# Patient Record
Sex: FEMALE | NOT HISPANIC OR LATINO | Employment: UNEMPLOYED | URBAN - METROPOLITAN AREA
[De-identification: names, ages, dates, MRNs, and addresses within clinical notes are randomized per-mention and may not be internally consistent; named-entity substitution may affect disease eponyms.]

---

## 2024-06-13 ENCOUNTER — HOSPITAL ENCOUNTER (OUTPATIENT)
Dept: RADIOLOGY | Facility: CLINIC | Age: 44
Discharge: HOME | End: 2024-06-13
Payer: COMMERCIAL

## 2024-06-13 VITALS — WEIGHT: 201.06 LBS | BODY MASS INDEX: 35.62 KG/M2 | HEIGHT: 63 IN

## 2024-06-13 DIAGNOSIS — Z12.31 ENCOUNTER FOR SCREENING MAMMOGRAM FOR MALIGNANT NEOPLASM OF BREAST: ICD-10-CM

## 2024-06-13 PROCEDURE — 77067 SCR MAMMO BI INCL CAD: CPT

## 2024-06-17 ENCOUNTER — APPOINTMENT (OUTPATIENT)
Dept: CARDIOLOGY | Facility: CLINIC | Age: 44
End: 2024-06-17
Payer: COMMERCIAL

## 2024-06-17 VITALS
DIASTOLIC BLOOD PRESSURE: 95 MMHG | HEART RATE: 62 BPM | SYSTOLIC BLOOD PRESSURE: 139 MMHG | BODY MASS INDEX: 33.49 KG/M2 | HEIGHT: 63 IN | WEIGHT: 189 LBS

## 2024-06-17 DIAGNOSIS — Z76.89 ENCOUNTER TO ESTABLISH CARE WITH NEW DOCTOR: ICD-10-CM

## 2024-06-17 DIAGNOSIS — E06.3 HASHIMOTO'S THYROIDITIS: ICD-10-CM

## 2024-06-17 DIAGNOSIS — F41.1 GENERALIZED ANXIETY DISORDER: ICD-10-CM

## 2024-06-17 DIAGNOSIS — Z87.891 FORMER SMOKER: ICD-10-CM

## 2024-06-17 DIAGNOSIS — R06.09 DOE (DYSPNEA ON EXERTION): ICD-10-CM

## 2024-06-17 PROBLEM — F33.0 MILD EPISODE OF RECURRENT MAJOR DEPRESSIVE DISORDER (CMS-HCC): Status: ACTIVE | Noted: 2023-07-28

## 2024-06-17 PROCEDURE — 99204 OFFICE O/P NEW MOD 45 MIN: CPT | Performed by: INTERNAL MEDICINE

## 2024-06-17 PROCEDURE — 3008F BODY MASS INDEX DOCD: CPT | Performed by: INTERNAL MEDICINE

## 2024-06-17 PROCEDURE — 1036F TOBACCO NON-USER: CPT | Performed by: INTERNAL MEDICINE

## 2024-06-17 RX ORDER — PAROXETINE HYDROCHLORIDE 40 MG/1
40 TABLET, FILM COATED ORAL
COMMUNITY

## 2024-06-17 RX ORDER — LEVOTHYROXINE SODIUM 125 UG/1
125 TABLET ORAL DAILY
COMMUNITY

## 2024-06-17 NOTE — PATIENT INSTRUCTIONS
Patient to follow up after testing with Dr. Arleen Mckeon MD [facility]     Office will arrange Graded Exercise Stress and Echo in near future.     No other changes today.   Continue same medications and treatments.   Patient educated on proper medication use.   Patient educated on risk factor modification.   Please bring any lab results from other providers / physicians to your next appointment.     Please bring all medicines, vitamins, and herbal supplements with you when you come to the office.     Prescriptions will not be filled unless you are compliant with your follow up appointments or have a follow up appointment scheduled as per instruction of your physician. Refills should be requested at the time of your visit.    Chaim MARX RN am scribing for and in the presence of Dr. Arleen Mckeon MD [facility]

## 2024-06-17 NOTE — PROGRESS NOTES
Referred by Dr. Awad ref. provider found provider found for   Chief Complaint   Patient presents with    [facility] Care     Establish care, sob and chest pain since Dec that comes and goes.         History of Present Illness  Pilar Alfonso is a 43 y.o. year old female patient is here for cardiac evaluation.  She is 43 years old no previous cardiac history.  She says has recently been having increasing episode of shortness of breath associated with left-sided chest pain.  Describes it as heaviness in the chest when she walks.  This has been happening the last few months and has been increasing in frequency and intensity.  Patient past history including history of hypothyroidism taking medication and apparently the levels been normal recently.  She is non-smoker no significant family history of premature coronary disease.  EKG shows sinus rhythm and no acute changes.  I had a lengthy discussion with the patient today about the shortness of breath.  We need to rule out ischemic heart disease or any sort of potential cardiomyopathy especially with history of hypothyroidism.  Will go ahead with echocardiogram and treadmill exercise stress test.  She will follow-up with me after testings are done    Past Medical History  No past medical history on file.    Social History  Social History     Tobacco Use    Smoking status: Former     Types: Cigarettes    Smokeless tobacco: Never   Substance Use Topics    Alcohol use: Not Currently    Drug use: Not Currently       Family History   No family history on file.    Review of Systems  As per HPI, all other systems reviewed and negative.    Allergies:  No Known Allergies     Outpatient Medications:  Current Outpatient Medications   Medication Instructions    levothyroxine (SYNTHROID, LEVOXYL) 125 mcg, oral, Daily    PARoxetine (PAXIL) 40 mg, oral, Daily before breakfast         Vitals:  Vitals:    06/17/24 1613   BP: (!) 139/95   Pulse: 62       Physical Exam:  Physical Exam  Vitals  and nursing note reviewed.   Constitutional:       Appearance: Normal appearance. She is normal weight.   HENT:      Head: Normocephalic and atraumatic.   Eyes:      Extraocular Movements: Extraocular movements intact.      Pupils: Pupils are equal, round, and reactive to light.   Cardiovascular:      Rate and Rhythm: Normal rate and regular rhythm.      Pulses: Normal pulses.   Pulmonary:      Effort: Pulmonary effort is normal.      Breath sounds: Normal breath sounds.   Musculoskeletal:      Cervical back: Normal range of motion.      Right lower leg: No edema.      Left lower leg: No edema.   Skin:     General: Skin is warm and dry.   Neurological:      General: No focal deficit present.      Mental Status: She is alert and oriented to person, place, and time.             Assessment/Plan   Diagnoses and all orders for this visit:  Hashimoto's thyroiditis  Generalized anxiety disorder  MCKENZIE (dyspnea on exertion)  Encounter to establish care with new doctor  BMI 33.0-33.9,adult  Former smoker          Arleen Mckeon MD [facility]  Interventional Cardiology   of [facility]     Thank you for allowing me to participate in the care of this patient. Please do not hesitate to contact me with any further questions or concerns.

## 2024-07-17 ENCOUNTER — APPOINTMENT (OUTPATIENT)
Dept: CARDIOLOGY | Facility: CLINIC | Age: 44
End: 2024-07-17
Payer: COMMERCIAL

## 2024-08-05 ENCOUNTER — APPOINTMENT (OUTPATIENT)
Dept: CARDIOLOGY | Facility: CLINIC | Age: 44
End: 2024-08-05
Payer: COMMERCIAL

## 2024-08-14 ENCOUNTER — APPOINTMENT (OUTPATIENT)
Dept: CARDIOLOGY | Facility: HOSPITAL | Age: 44
End: 2024-08-14
Payer: COMMERCIAL

## 2024-08-15 ENCOUNTER — APPOINTMENT (OUTPATIENT)
Dept: CARDIOLOGY | Facility: CLINIC | Age: 44
End: 2024-08-15
Payer: COMMERCIAL

## 2024-10-18 ENCOUNTER — OFFICE VISIT (OUTPATIENT)
Dept: PRIMARY CARE | Facility: EXTERNAL LOCATION | Age: 44
End: 2024-10-18

## 2024-10-18 VITALS
HEART RATE: 62 BPM | SYSTOLIC BLOOD PRESSURE: 122 MMHG | DIASTOLIC BLOOD PRESSURE: 82 MMHG | WEIGHT: 190 LBS | TEMPERATURE: 97.8 F | OXYGEN SATURATION: 97 %

## 2024-10-18 DIAGNOSIS — R09.82 POST-NASAL DRIP: ICD-10-CM

## 2024-10-18 DIAGNOSIS — R05.1 ACUTE COUGH: Primary | ICD-10-CM

## 2024-10-18 DIAGNOSIS — J02.9 ACUTE PHARYNGITIS, UNSPECIFIED ETIOLOGY: ICD-10-CM

## 2024-10-18 LAB — POC RAPID STREP: NEGATIVE

## 2024-10-18 RX ORDER — FLUTICASONE PROPIONATE 50 MCG
1 SPRAY, SUSPENSION (ML) NASAL DAILY
Qty: 16 G | Refills: 5 | Status: SHIPPED | OUTPATIENT
Start: 2024-10-18 | End: 2025-10-18

## 2024-10-18 RX ORDER — LEVOTHYROXINE SODIUM 125 UG/1
TABLET ORAL
COMMUNITY
Start: 2024-09-29

## 2024-10-18 RX ORDER — PAROXETINE HYDROCHLORIDE 40 MG/1
40 TABLET, FILM COATED ORAL
COMMUNITY
Start: 2024-09-29

## 2024-10-18 RX ORDER — BENZONATATE 200 MG/1
200 CAPSULE ORAL 3 TIMES DAILY PRN
Qty: 21 CAPSULE | Refills: 0 | Status: SHIPPED | OUTPATIENT
Start: 2024-10-18 | End: 2024-10-25

## 2024-10-18 ASSESSMENT — ENCOUNTER SYMPTOMS
SORE THROAT: 1
SHORTNESS OF BREATH: 0
CHILLS: 0
WHEEZING: 0
COUGH: 1
FEVER: 0
PALPITATIONS: 0

## 2024-10-18 NOTE — PATIENT INSTRUCTIONS
- Discussed viral versus bacterial etiology and expected course of illness.  Suspect viral illness.   - Increase liquid intake.  - Can use over the counter medication as needed.   - Use humidified air in sleeping areas.   - Follow up with PCP in 3-4 days if no improvement.   - Return to clinic or go to urgent care sooner with concerns or worsening symptoms.     STEPS YOU CAN TAKE AT HOME  - Get lots of rest, and drink plenty of liquids.  - Drink hot tea.  - Suck on cough drops or hard candy.  - Take over-the-counter cough and cold medicines.  - Breath in warm, moist air, such as in the shower, over a kettle, or from a humidifier.  - Use humidified air in sleeping areas.   - Take a pain-relieving medicine if you have cold or flu symptoms like headache, muscle aches, or joint pain  - Avoid smoking or being around others who smoke.   - Wash hands often.   - Do not share utensils and drinking glasses. Wash these objects with hot, soapy water.  - Do not share foods or drinks with others while you or they are sick.    CALL YOUR PROVIDER/GO TO URGENT CARE OR ED IF:   - You have trouble breathing or swallowing.  - Your neck, tongue, or throat is swollen.  - You are drooling because you cannot swallow your own saliva  - You cannot keep fluids down  - You develop chest pain or shortness of breath  - Your voice sounds strange, like you are talking through your nose.  - You can’t open your mouth all the way.  - You have a stiff neck.   - Fever higher than 100.4°F (38°C)  - Chest pain when you cough, trouble breathing, or coughing up blood  - A barking cough that makes it hard to talk  - Cough and weight loss that you cannot explain  - Symptoms that are worsening

## 2024-10-18 NOTE — PROGRESS NOTES
"Subjective   Patient ID: Pilar Alfonso is a 43 y.o. female who presents for Laryngitis, Sore Throat, Nasal Congestion, and Chest Pain (Congestion, heaviness).    HPI:  2 days ago with sore throat. 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   Then turned into laryngitis, congestion and chest feels \"phlegmy\"  No chest pain or SOB.  Did feel mildly winded when walking up a large hill.   No fevers or chills.     No Known Allergies    Current Outpatient Medications   Medication Sig Dispense Refill    levothyroxine (Synthroid, Levoxyl) 125 mcg tablet TAKE 1 TABLET (125 MCG) BY MOUTH IN THE MORNING. TAKE BEFORE MEALS.      PARoxetine (Paxil) 40 mg tablet Take 1 tablet (40 mg) by mouth once daily in the morning. Take before meals.      benzonatate (Tessalon) 200 mg capsule Take 1 capsule (200 mg) by mouth 3 times a day as needed for cough for up to 7 days. Do not crush or chew. 21 capsule 0    fluticasone (Flonase) 50 mcg/actuation nasal spray Administer 1 spray into each nostril once daily. Shake gently. Before first use, prime pump. After use, clean tip and replace cap. 16 g 5     No current facility-administered medications for this visit.        Past Medical History:   Diagnosis Date    Anxiety     Depression     Hypothyroid        History reviewed. No pertinent surgical history.    Review of Systems   Constitutional:  Negative for chills and fever.   HENT:  Positive for congestion and sore throat.    Respiratory:  Positive for cough. Negative for shortness of breath and wheezing.    Cardiovascular:  Negative for chest pain, palpitations and leg swelling.       Objective   Visit Vitals  /82   Pulse 62   Temp 36.6 °C (97.8 °F)   Wt 86.2 kg (190 lb)   LMP 10/10/2024   SpO2 97%   OB Status Having periods   Smoking Status Never       Office Visit on 10/18/2024   Component Date Value Ref Range Status    POC Rapid Strep 10/18/2024 Negative  Negative Final       Physical Exam  Constitutional:       Appearance: " Normal appearance.      Comments: Hoarse voice    HENT:      Right Ear: Tympanic membrane, ear canal and external ear normal.      Left Ear: Tympanic membrane, ear canal and external ear normal.      Nose: Congestion present.      Mouth/Throat:      Mouth: Mucous membranes are moist.      Pharynx: Oropharynx is clear. Posterior oropharyngeal erythema present.   Eyes:      General:         Right eye: No discharge.         Left eye: No discharge.      Extraocular Movements: Extraocular movements intact.      Conjunctiva/sclera: Conjunctivae normal.      Pupils: Pupils are equal, round, and reactive to light.   Cardiovascular:      Rate and Rhythm: Normal rate and regular rhythm.   Pulmonary:      Effort: Pulmonary effort is normal. No respiratory distress.      Breath sounds: Normal breath sounds. No wheezing, rhonchi or rales.      Comments: Talking in complete sentences without difficulty.   Musculoskeletal:      Cervical back: Normal range of motion and neck supple.   Neurological:      General: No focal deficit present.      Mental Status: She is alert.   Psychiatric:         Mood and Affect: Mood normal.         Behavior: Behavior normal.         Thought Content: Thought content normal.         Assessment/Plan   Diagnoses and all orders for this visit:  Acute cough  -     benzonatate (Tessalon) 200 mg capsule; Take 1 capsule (200 mg) by mouth 3 times a day as needed for cough for up to 7 days. Do not crush or chew.  Post-nasal drip  -     fluticasone (Flonase) 50 mcg/actuation nasal spray; Administer 1 spray into each nostril once daily. Shake gently. Before first use, prime pump. After use, clean tip and replace cap.  Acute pharyngitis, unspecified etiology  -     POCT rapid strep A manually resulted    - Rapid strep negative.   - Discussed viral versus bacterial etiology and expected course of illness.   - Has had symptoms for 2 days. No acute findings on physical exam.   - Suspect viral illness. Recommend OTC  relief as needed. Increase liquid intake.   - Patient declined covid and flu test.   - Follow up in 3-4 days if no improvement. Sooner with concerns or any worsening symptoms.

## 2024-11-04 ENCOUNTER — HOSPITAL ENCOUNTER (OUTPATIENT)
Dept: RADIOLOGY | Facility: HOSPITAL | Age: 44
Discharge: HOME | End: 2024-11-04
Payer: COMMERCIAL

## 2024-11-04 DIAGNOSIS — M54.6 PAIN IN THORACIC SPINE: ICD-10-CM

## 2024-11-04 DIAGNOSIS — G89.29 OTHER CHRONIC PAIN: ICD-10-CM

## 2024-11-04 DIAGNOSIS — R07.9 CHEST PAIN, UNSPECIFIED: ICD-10-CM

## 2024-11-04 PROCEDURE — 71100 X-RAY EXAM RIBS UNI 2 VIEWS: CPT | Mod: LT

## 2024-11-04 PROCEDURE — 73030 X-RAY EXAM OF SHOULDER: CPT | Mod: LEFT SIDE | Performed by: RADIOLOGY

## 2024-11-04 PROCEDURE — 73030 X-RAY EXAM OF SHOULDER: CPT | Mod: LT

## 2024-11-04 PROCEDURE — 72040 X-RAY EXAM NECK SPINE 2-3 VW: CPT

## 2024-11-04 PROCEDURE — 71100 X-RAY EXAM RIBS UNI 2 VIEWS: CPT | Mod: LEFT SIDE | Performed by: RADIOLOGY

## 2024-11-04 PROCEDURE — 72040 X-RAY EXAM NECK SPINE 2-3 VW: CPT | Performed by: RADIOLOGY

## 2024-11-08 ENCOUNTER — APPOINTMENT (OUTPATIENT)
Dept: RADIOLOGY | Facility: HOSPITAL | Age: 44
End: 2024-11-08
Payer: COMMERCIAL

## 2024-12-02 ENCOUNTER — APPOINTMENT (OUTPATIENT)
Dept: RADIOLOGY | Facility: CLINIC | Age: 44
End: 2024-12-02
Payer: COMMERCIAL

## 2024-12-11 ENCOUNTER — OFFICE VISIT (OUTPATIENT)
Dept: PRIMARY CARE | Facility: EXTERNAL LOCATION | Age: 44
End: 2024-12-11

## 2024-12-11 VITALS
OXYGEN SATURATION: 96 % | WEIGHT: 190 LBS | BODY MASS INDEX: 33.66 KG/M2 | SYSTOLIC BLOOD PRESSURE: 124 MMHG | TEMPERATURE: 97.8 F | DIASTOLIC BLOOD PRESSURE: 81 MMHG | HEART RATE: 59 BPM

## 2024-12-11 DIAGNOSIS — J40 BRONCHITIS: Primary | ICD-10-CM

## 2024-12-11 RX ORDER — AZITHROMYCIN 250 MG/1
TABLET, FILM COATED ORAL
Qty: 6 TABLET | Refills: 0 | Status: SHIPPED | OUTPATIENT
Start: 2024-12-11 | End: 2024-12-16

## 2024-12-11 RX ORDER — BENZONATATE 200 MG/1
200 CAPSULE ORAL 3 TIMES DAILY PRN
Qty: 21 CAPSULE | Refills: 0 | Status: SHIPPED | OUTPATIENT
Start: 2024-12-11 | End: 2024-12-18

## 2024-12-11 ASSESSMENT — ENCOUNTER SYMPTOMS
WHEEZING: 0
SHORTNESS OF BREATH: 0
CHILLS: 0
PALPITATIONS: 0
FATIGUE: 1
SINUS PAIN: 0
RHINORRHEA: 1
FEVER: 0
COUGH: 1
SINUS PRESSURE: 0

## 2024-12-11 NOTE — PROGRESS NOTES
Subjective   Patient ID: Pilar Alfonso is a 44 y.o. female who presents for Cough (Sometimes productive /2 weeks).    HPI:  Was seen 10/18/24 for cough. Was given tessalon and Fonase. Got better for a few days. Then got sick again with cough and congestion. Once again got better after a few weeks.  Did not take any antibiotics.     Cough and congestion came back again 2 weeks ago. Does not feel like cough ever completely resolved.  Congestion from 2 weeks ago went away but cough remains.  No sinus pressure but does have slight running nose. No fever or chills.  No chest pain or SOB.   Cough is constant. Cannot stop coughing, feels like in chest. Some fatigue but able to work and complete normal daily activities.  LMP: last week. Denies concern for pregnancy.   Denies history of heart problems or diagnosis of cardiac abnormality.     No Known Allergies    Current Outpatient Medications   Medication Sig Dispense Refill    levothyroxine (Synthroid, Levoxyl) 125 mcg tablet TAKE 1 TABLET (125 MCG) BY MOUTH IN THE MORNING. TAKE BEFORE MEALS.      PARoxetine (Paxil) 40 mg tablet Take 1 tablet (40 mg) by mouth once daily in the morning. Take before meals.      azithromycin (Zithromax) 250 mg tablet Take 2 tablets (500 mg) by mouth once daily for 1 day, THEN 1 tablet (250 mg) once daily for 4 days. Take 2 tabs (500 mg) by mouth today, than 1 daily for 4 days.. 6 tablet 0    benzonatate (Tessalon) 200 mg capsule Take 1 capsule (200 mg) by mouth 3 times a day as needed for cough for up to 7 days. Do not crush or chew. 21 capsule 0     No current facility-administered medications for this visit.        Past Medical History:   Diagnosis Date    Anxiety     Depression     Hashimoto's thyroiditis 07/28/2023    Last Assessment & Plan:    Formatting of this note might be different from the original.   TSH stable from 1/2024 - Continue Synthroid daily      Hypothyroid        History reviewed. No pertinent surgical  history.    Review of Systems   Constitutional:  Positive for fatigue. Negative for chills and fever.   HENT:  Positive for congestion (resolved) and rhinorrhea. Negative for sinus pressure and sinus pain.    Respiratory:  Positive for cough. Negative for shortness of breath and wheezing.    Cardiovascular:  Negative for chest pain and palpitations.       Objective   Visit Vitals  /81   Pulse 59   Temp 36.6 °C (97.8 °F) (Temporal)   Wt 86.2 kg (190 lb)   LMP 11/04/2024   SpO2 96%   BMI 33.66 kg/m²   OB Status Having periods   Smoking Status Never   BSA 1.96 m²       Physical Exam  Constitutional:       General: She is not in acute distress.     Appearance: Normal appearance. She is not ill-appearing or toxic-appearing.   HENT:      Right Ear: Tympanic membrane, ear canal and external ear normal.      Left Ear: Tympanic membrane, ear canal and external ear normal.      Nose: Nose normal.      Mouth/Throat:      Mouth: Mucous membranes are moist.      Pharynx: Oropharynx is clear. No oropharyngeal exudate or posterior oropharyngeal erythema.      Comments: PND noted  Eyes:      General:         Right eye: No discharge.      Extraocular Movements: Extraocular movements intact.      Conjunctiva/sclera: Conjunctivae normal.   Cardiovascular:      Rate and Rhythm: Normal rate and regular rhythm.   Pulmonary:      Effort: Pulmonary effort is normal. No respiratory distress.      Breath sounds: Rhonchi (scant in upper lobes RIGO) present. No wheezing or rales.   Neurological:      General: No focal deficit present.      Mental Status: She is alert.   Psychiatric:         Mood and Affect: Mood normal.         Behavior: Behavior normal.         Thought Content: Thought content normal.       Assessment/Plan   Diagnoses and all orders for this visit:  Bronchitis  -     azithromycin (Zithromax) 250 mg tablet; Take 2 tablets (500 mg) by mouth once daily for 1 day, THEN 1 tablet (250 mg) once daily for 4 days. Take 2 tabs  (500 mg) by mouth today, than 1 daily for 4 days..  -     benzonatate (Tessalon) 200 mg capsule; Take 1 capsule (200 mg) by mouth 3 times a day as needed for cough for up to 7 days. Do not crush or chew.      - Prescription sent to pharmacy and possible side effects discussed.   - Consider CXR if symptoms persist.   - Recommend Flonase daily, Mucinex as needed, increase liquid intake and use humidified air in sleeping areas.   - Follow up with PCP in 3-5 days if no improvement.  Can be seen sooner in UC with concerns or with any worsening symptoms.   - Recommend annual exams/preventative screenings with PCP.

## 2024-12-11 NOTE — PATIENT INSTRUCTIONS
Medication side effects and possible interactions discussed   Can use over the counter Mucinex as needed, increase liquid intake and use humidified air in sleeping areas.   Follow up with your primary care provider if no improvement in 3-5 days. Please be seen sooner with concerns or any worsening symptoms.     STEPS YOU CAN TAKE AT HOME  - Get lots of rest, and drink plenty of liquids.  - Drink hot tea.  - Suck on cough drops or hard candy.  - Take over-the-counter cough and cold medicines.  - Breath in warm, moist air, such as in the shower, over a kettle, or from a humidifier.  - Take a pain-relieving medicine if you have cold or flu symptoms like headache, muscle aches, or joint pain  - Avoid smoking or being around others who smoke.     Follow up with your primary care provider or go to urgent care if you develop:  ?Fever higher than 100.4°F (38°C)  ?Chest pain when you cough, trouble breathing, or coughing up blood  ?A barking cough that makes it hard to talk  ?Cough and weight loss that you cannot explain  ?Symptoms that are not getting better or are getting worse.

## 2024-12-12 ENCOUNTER — APPOINTMENT (OUTPATIENT)
Dept: CARDIOLOGY | Facility: CLINIC | Age: 44
End: 2024-12-12
Payer: COMMERCIAL

## 2025-02-05 ENCOUNTER — OFFICE VISIT (OUTPATIENT)
Dept: ORTHOPEDIC SURGERY | Facility: CLINIC | Age: 45
End: 2025-02-05
Payer: COMMERCIAL

## 2025-02-05 DIAGNOSIS — M76.899 HAMSTRING TENDONITIS: Primary | ICD-10-CM

## 2025-02-05 DIAGNOSIS — S76.311A STRAIN OF INSERTION OF TENDON OF RIGHT HAMSTRING MUSCLE: ICD-10-CM

## 2025-02-05 PROCEDURE — 99213 OFFICE O/P EST LOW 20 MIN: CPT | Performed by: INTERNAL MEDICINE

## 2025-02-05 PROCEDURE — 99203 OFFICE O/P NEW LOW 30 MIN: CPT | Performed by: INTERNAL MEDICINE

## 2025-02-05 NOTE — PROGRESS NOTES
Acute Injury New Patient Visit    CC: No chief complaint on file.      HPI: Pilar is a 44 y.o. female presents today for evaluation for subacute left leg injury sustained while running almost 3 weeks ago. She notes worsening left leg pain and difficulty weightbearing. She is here for initial evaluation and x-rays.  Pain began after running, there was no fall or traumatic injury.  No treatments been done.        Review of Systems   GENERAL: Negative for malaise, significant weight loss, fever  MUSCULOSKELETAL: See HPI  NEURO:  Negative for numbness / tingling     Past Medical History  Past Medical History:   Diagnosis Date    Anxiety     Depression     Hashimoto's thyroiditis 07/28/2023    Last Assessment & Plan:    Formatting of this note might be different from the original.   TSH stable from 1/2024 - Continue Synthroid daily      Hypothyroid        Medication review  Medication Documentation Review Audit       Reviewed by CHUNG Huber (Nurse Practitioner) on 12/11/24 at 1307      Medication Order Taking? Sig Documenting Provider Last Dose Status   fluticasone (Flonase) 50 mcg/actuation nasal spray [id number]  Administer 1 spray into each nostril once daily. Shake gently. Before first use, prime pump. After use, clean tip and replace cap.   Patient not taking: Reported on 12/11/2024    CHUNG Huber  Active   levothyroxine (Synthroid, Levoxyl) 125 mcg tablet [id number] No Take 1 tablet (125 mcg) by mouth early in the morning..   Patient not taking: Reported on 12/11/2024    Historical MD Yossi Taking Active   levothyroxine (Synthroid, Levoxyl) 125 mcg tablet [id number] Yes TAKE 1 TABLET (125 MCG) BY MOUTH IN THE MORNING. TAKE BEFORE MEALS. Historical Provider, MD  Active   PARoxetine (Paxil) 40 mg tablet [id number] No Take 1 tablet (40 mg) by mouth once daily in the morning. Take before meals.   Patient not taking: Reported on 12/11/2024    Historical MD Yossi Taking Active    PARoxetine (Paxil) 40 mg tablet [id number] Yes Take 1 tablet (40 mg) by mouth once daily in the morning. Take before meals. Historical Provider, MD  Active                    Allergies  No Known Allergies    Social History  Social History     Socioeconomic History    Marital status: Single     Spouse name: Not on file    Number of children: Not on file    Years of education: Not on file    Highest education level: Not on file   Occupational History    Not on file   Tobacco Use    Smoking status: Never     Passive exposure: Never    Smokeless tobacco: Never   Vaping Use    Vaping status: Never Used   Substance and Sexual Activity    Alcohol use: Not Currently    Drug use: Not Currently    Sexual activity: Not on file   Other Topics Concern    Not on file   Social History Narrative    ** Merged History Encounter **          Social Drivers of Health     Financial Resource Strain: Low Risk  (8/21/2023)    Received from [facility]    Overall Financial Resource Strain (CARDIA)     Difficulty of Paying Living Expenses: Not hard at all   Food Insecurity: No Food Insecurity (8/21/2023)    Received from [facility]    Hunger Vital Sign     Worried About Running Out of Food in the Last Year: Never true     Ran Out of Food in the Last Year: Never true   Transportation Needs: No Transportation Needs (8/21/2023)    Received from [facility]    PRAPARE - Transportation     Lack of Transportation (Medical): No     Lack of Transportation (Non-Medical): No   Physical Activity: Sufficiently Active (8/21/2023)    Received from [facility]    Exercise Vital Sign     Days of Exercise per Week: 7 days     Minutes of Exercise per Session: 60 min   Stress: Stress Concern Present (8/21/2023)    Received from [facility]    [employer] - Occupational Stress Questionnaire     Feeling of Stress : To some extent    Social Connections: Moderately Integrated (8/21/2023)    [facility] from [facility]    Social Connection and Isolation Panel [NHANES]     Frequency of Communication with Friends and Family: More than three times a week     Frequency of Social Gatherings with Friends and Family: Once a week     Attends Taoist Services: 1 to 4 times per year     Active Member of Clubs or Organizations: No     Attends Club or Organization Meetings: Never     Marital Status:    Intimate Partner Violence: Not At Risk (8/21/2023)    Received from [facility]    Humiliation, Afraid, Rape, and Kick questionnaire     Fear of Current or Ex-Partner: No     Emotionally Abused: No     Physically Abused: No     Sexually Abused: No   Housing Stability: Low Risk  (8/21/2023)    Received from [facility]    Housing Stability Vital Sign     Unable to Pay for Housing in the Last Year: No     Number of Places Lived in the Last Year: 1     Unstable Housing in the Last Year: No       Surgical History  No past surgical history on file.    Physical Exam:  GENERAL:  Patient is awake, alert, and oriented to person place and time.  Patient appears well nourished and well kept.  Affect Calm, Not Acutely Distressed.  HEENT:  Normocephalic, Atraumatic, EOMI  CARDIOVASCULAR:  Hemodynamically stable.  RESPIRATORY:  Normal respirations with unlabored breathing.  Extremity: Left lower leg shows skin is intact.  There is no erythema or warmth.  There is no left knee joint effusion.  No clinical signs of infection.  Pain mainly over the hamstring tendon at its insertion.  Mild pain over the hamstring muscle belly of the biceps femoris muscle belly.  There is no pain over the hamstring tendon at its insertion of the ischial tuberosity.  Pain with resisted flexion of the left knee.  Negative valgus and varus stress test of the left knee.  Negative Lachman's test of the left knee.  Patellar and  quadricep mechanism is intact.  Negative Latasha's test medially and laterally.  Negative anterior and posterior drawer test.  Negative patellar apprehension test.  No pain of the lateral or medial malleolus.  There is no pain of the proximal tibia or proximal fibula.  Right leg was examined for comparison.      Diagnostics: None today      Procedure: None    Assessment:   Left insertional hamstring tendonitis  Left hamstring strain    Plan: Pilar presents today for evaluation for subacute left leg injury sustained while running. We recommended physical therapy, icing and anti-inflammatories.  We did offer a Medrol Dosepak for the acute inflammation, patient declined.  We discussed the potential for high reoccurrence and reaggravation rate.  She will follow-up in 4-5 weeks for reevaluation.  If no improvement we may consider further advanced imaging.    No orders of the defined types were placed in this encounter.     At the conclusion of the visit there were no further questions by the patient/family regarding their plan of care.  Patient was instructed to call or return with any issues, questions, or concerns regarding their injury and/or treatment plan described above.     02/05/25 at 1:07 PM - Adriana Hardwick MD  Scribe Attestation  By signing my name below, I, Jose A Izabella Richards   attest that this documentation has been prepared under the direction and in the presence of Adriana Hardwick MD.    Office: [phone]    This note was prepared using voice recognition software.  The details of this note are correct and have been reviewed, and corrected to the best of my ability.  Some grammatical errors may persist related to the Dragon software.

## 2025-02-07 ENCOUNTER — APPOINTMENT (OUTPATIENT)
Dept: ORTHOPEDIC SURGERY | Facility: CLINIC | Age: 45
End: 2025-02-07
Payer: COMMERCIAL

## 2025-03-13 ENCOUNTER — APPOINTMENT (OUTPATIENT)
Dept: ORTHOPEDIC SURGERY | Facility: CLINIC | Age: 45
End: 2025-03-13
Payer: COMMERCIAL